# Patient Record
Sex: MALE | Race: WHITE
[De-identification: names, ages, dates, MRNs, and addresses within clinical notes are randomized per-mention and may not be internally consistent; named-entity substitution may affect disease eponyms.]

---

## 2018-08-31 ENCOUNTER — HOSPITAL ENCOUNTER (EMERGENCY)
Dept: HOSPITAL 47 - EC | Age: 33
Discharge: HOME | End: 2018-08-31
Payer: COMMERCIAL

## 2018-08-31 VITALS
SYSTOLIC BLOOD PRESSURE: 137 MMHG | TEMPERATURE: 97.8 F | DIASTOLIC BLOOD PRESSURE: 79 MMHG | HEART RATE: 71 BPM | RESPIRATION RATE: 18 BRPM

## 2018-08-31 DIAGNOSIS — S97.82XA: Primary | ICD-10-CM
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
## 2018-08-31 DIAGNOSIS — F17.200: ICD-10-CM
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
## 2018-08-31 DIAGNOSIS — Z88.0: ICD-10-CM
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
## 2018-08-31 PROCEDURE — 99283 EMERGENCY DEPT VISIT LOW MDM: CPT

## 2018-08-31 NOTE — XR
EXAM:

  XR Left Foot Complete, 3 or More Views.

 

CLINICAL HISTORY:

  Reason: Pain

 

TECHNIQUE:

  Frontal, lateral and oblique views of the left foot.

 

COMPARISON:

  No relevant prior studies available.

 

FINDINGS:

  Bones: No acute fracture.

  Joints:  No dislocation.  Mild hallux valgus, with approximately 25 

angulation at first MTP.

  Soft tissues:  Unremarkable.  No radiopaque foreign body.

 

IMPRESSION:     

No fracture or dislocation.

 

Mild hallux valgus.

## 2018-08-31 NOTE — ED
General Adult HPI





- General


Chief complaint: Extremity Injury, Lower


Stated complaint: Foot injury-[facility]


Source: patient


Mode of arrival: ambulatory


Limitations: no limitations





- History of Present Illness


Initial comments: 





Dictation was produced using dragon dictation software. please excuse any 

grammatical, word or spelling errors. 





Chief Complaint: 33-year-old male presents with left foot pain.





History of Present Illness: He was at work.  He works at a car [employer].  He 

states his foot was run over by a metal cart with several items on it.  States 

that his foot hurts severely.  Incident occurred approximately 5 AM.  Patient 

states he is having difficulty walking.  Denies any lacerations.








The ROS documented in this emergency department record has been reviewed and 

confirmed by me.  Those systems with pertinent positive or negative responses 

have been documented in the HPI.  All other systems are other negative and/or 

noncontributory.





- Related Data


 Home Medications











 Medication  Instructions  Recorded  Confirmed


 


Aspirin-Acet-Caff [phone] 1 each PO Q6HR PRN 08/31/18 08/31/18





[Excedrin]   











 Allergies











Allergy/AdvReac Type Severity Reaction Status Date / Time


 


Penicillins Allergy  Rash/Hives Verified 08/31/18 06:12














Review of Systems


ROS Statement: 


Those systems with pertinent positive or pertinent negative responses have been 

documented in the HPI.





ROS Other: All systems not noted in ROS Statement are negative.





Past Medical History


Past Medical History: No Reported History


History of Any Multi-Drug Resistant Organisms: None Reported


Past Surgical History: No Surgical Hx Reported


Past Psychological History: No Psychological Hx Reported


Smoking Status: Current every day smoker


Past Alcohol Use History: None Reported


Past Drug Use History: None Reported





General Exam





- General Exam Comments


Initial Comments: 











PHYSICAL EXAM:


General Impression: Alert and oriented x3, not in acute distress


HEENT: Normocephalic atraumatic, extra-ocular movements intact, pupils equal 

and reactive to light bilaterally, mucous membranes moist.


Cardiovascular: Heart regular rate and rhythm, S1&S2 audible, no murmurs, rubs 

or gallops


Chest: Lungs clear to auscultation bilaterally, no rhonchi, no wheeze, no rales


Abdomen: Bowel sounds present, abdomen soft, non-tender, non-distended, no 

organomegaly


Musculoskeletal: Pulses present and equal in all extremities, no peripheral 

edema, tenderness to palpation of all toes of the left foot, no lacerations, 

mild erythema


Motor: Power 5/5 bilaterally, no focal deficits noted


Neurological: CN II-XII grossly intact, no focal motor or sensory deficits noted


Skin: Intact with no visualized rashes


Psych: Normal affect and mood


Limitations: no limitations





Course





 Vital Signs











  08/31/18





  06:01


 


Temperature 97.8 F


 


Pulse Rate 71


 


Respiratory 18





Rate 


 


Blood Pressure 137/79


 


O2 Sat by Pulse 96





Oximetry 














Medical Decision Making





- Medical Decision Making








ED course: 33-year-old male presents with left foot pain after his toes were 

run over by a cart at work.  All signs upon arrival are within normal limits.  

Toes on physical exam are mildly erythematous however otherwise appears 

atraumatic.  X-ray shows no acute processes.  Patient told to take Motrin 

Tylenol when necessary pain.  Patient understandable agreeable to plan.  

Advised follow-up with primary care physician upon discharge.  Told to seek 

medical attention with any worsening symptoms.

















Disposition


Clinical Impression: 


 Crush injury of foot





Disposition: HOME SELF-CARE


Instructions:  Foot Contusion (ED)


Is patient prescribed a controlled substance at d/c from ED?: No


Referrals: 


Hudson Alexis MD [Primary Care Provider] - 1-2 days


Time of Disposition: 07:23